# Patient Record
Sex: MALE | Race: WHITE | NOT HISPANIC OR LATINO | Employment: FULL TIME | URBAN - METROPOLITAN AREA
[De-identification: names, ages, dates, MRNs, and addresses within clinical notes are randomized per-mention and may not be internally consistent; named-entity substitution may affect disease eponyms.]

---

## 2023-02-03 ENCOUNTER — HOSPITAL ENCOUNTER (EMERGENCY)
Facility: HOSPITAL | Age: 28
Discharge: HOME | End: 2023-02-03
Attending: EMERGENCY MEDICINE | Admitting: EMERGENCY MEDICINE
Payer: COMMERCIAL

## 2023-02-03 VITALS
WEIGHT: 198.6 LBS | TEMPERATURE: 98.1 F | HEART RATE: 85 BPM | HEIGHT: 70 IN | OXYGEN SATURATION: 96 % | BODY MASS INDEX: 28.43 KG/M2 | SYSTOLIC BLOOD PRESSURE: 121 MMHG | RESPIRATION RATE: 18 BRPM | DIASTOLIC BLOOD PRESSURE: 79 MMHG

## 2023-02-03 DIAGNOSIS — M54.2 NECK PAIN: Primary | ICD-10-CM

## 2023-02-03 DIAGNOSIS — S16.1XXA STRAIN OF NECK MUSCLE, INITIAL ENCOUNTER: ICD-10-CM

## 2023-02-03 DIAGNOSIS — M62.838 MUSCLE SPASM OF SHOULDER REGION: ICD-10-CM

## 2023-02-03 PROCEDURE — 97110 THERAPEUTIC EXERCISES: CPT | Performed by: PHYSICAL THERAPIST

## 2023-02-03 PROCEDURE — G0283 ELEC STIM OTHER THAN WOUND: HCPCS | Performed by: PHYSICAL THERAPIST

## 2023-02-03 PROCEDURE — 25010000002 KETOROLAC TROMETHAMINE PER 15 MG: Performed by: NURSE PRACTITIONER

## 2023-02-03 PROCEDURE — 97161 PT EVAL LOW COMPLEX 20 MIN: CPT | Performed by: PHYSICAL THERAPIST

## 2023-02-03 PROCEDURE — 96372 THER/PROPH/DIAG INJ SC/IM: CPT

## 2023-02-03 PROCEDURE — 25010000002 DEXAMETHASONE SODIUM PHOSPHATE 10 MG/ML SOLUTION: Performed by: NURSE PRACTITIONER

## 2023-02-03 PROCEDURE — 97140 MANUAL THERAPY 1/> REGIONS: CPT | Performed by: PHYSICAL THERAPIST

## 2023-02-03 PROCEDURE — 99283 EMERGENCY DEPT VISIT LOW MDM: CPT

## 2023-02-03 PROCEDURE — 25010000002 ORPHENADRINE CITRATE PER 60 MG: Performed by: NURSE PRACTITIONER

## 2023-02-03 RX ORDER — CYCLOBENZAPRINE HCL 10 MG
10 TABLET ORAL 3 TIMES DAILY PRN
Qty: 15 TABLET | Refills: 0 | Status: SHIPPED | OUTPATIENT
Start: 2023-02-03

## 2023-02-03 RX ORDER — METHYLPREDNISOLONE 4 MG/1
TABLET ORAL
Qty: 21 TABLET | Refills: 0 | Status: SHIPPED | OUTPATIENT
Start: 2023-02-03 | End: 2023-02-09

## 2023-02-03 RX ORDER — IBUPROFEN 800 MG/1
800 TABLET ORAL EVERY 8 HOURS PRN
Qty: 60 TABLET | Refills: 0 | Status: SHIPPED | OUTPATIENT
Start: 2023-02-03

## 2023-02-03 RX ORDER — KETOROLAC TROMETHAMINE 30 MG/ML
30 INJECTION, SOLUTION INTRAMUSCULAR; INTRAVENOUS ONCE
Status: COMPLETED | OUTPATIENT
Start: 2023-02-03 | End: 2023-02-03

## 2023-02-03 RX ORDER — DEXAMETHASONE SODIUM PHOSPHATE 10 MG/ML
10 INJECTION, SOLUTION INTRAMUSCULAR; INTRAVENOUS ONCE
Status: COMPLETED | OUTPATIENT
Start: 2023-02-03 | End: 2023-02-03

## 2023-02-03 RX ORDER — ORPHENADRINE CITRATE 30 MG/ML
60 INJECTION INTRAMUSCULAR; INTRAVENOUS ONCE
Status: COMPLETED | OUTPATIENT
Start: 2023-02-03 | End: 2023-02-03

## 2023-02-03 RX ADMIN — KETOROLAC TROMETHAMINE 30 MG: 30 INJECTION, SOLUTION INTRAMUSCULAR; INTRAVENOUS at 08:02

## 2023-02-03 RX ADMIN — DEXAMETHASONE SODIUM PHOSPHATE 10 MG: 10 INJECTION INTRAMUSCULAR; INTRAVENOUS at 08:04

## 2023-02-03 RX ADMIN — ORPHENADRINE CITRATE 60 MG: 30 INJECTION INTRAMUSCULAR; INTRAVENOUS at 08:00

## 2023-02-03 NOTE — THERAPY EVALUATION
Patient Name: Rober Workman  : 1995    MRN: [id number]                              Today's Date: 2/3/2023       Admit Date: 2/3/2023    Visit Dx:     ICD-10-CM ICD-9-CM   1. Neck pain  M54.2 723.1     There is no problem list on file for this patient.    History reviewed. No pertinent past medical history.  History reviewed. No pertinent surgical history.   General Information     Row Name 23 0819          Physical Therapy Time and Intention    Document Type evaluation  -LR     Mode of Treatment individual therapy  -LR     Row Name 23 0819          General Information    Patient Profile Reviewed yes  -LR     Prior Level of Function independent:  -LR           User Key  (r) = Recorded By, (t) = Taken By, (c) = Cosigned By    Initials Name Provider Type    LR Thais Colby, PT Physical Therapist              History: Pt reports he d antonio a [employer] for work at [employer] and a couple of weeks ago he started having pain in the left side of his neck and around his shoulder blade.  The pain has gotten much worse over the last few days.  He reports he has difficulty getting out of bed, turning his head, and looking up when the pain is bad.  He states his pain is a 4/10 at rest but a 10/10 when it is at its worst.  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  Pt is RHD.  Pt denies N/T.  He also reports lifting things increases his pain.  He has tried Ibuprofen and heat to help with pain.    Objective:    Palpation: Tender to palpation at cervical and upper thoracic spinous processes, L thoracic paraspinals, L levator scapula    ROM:  Active Cervical ROM:  Flexion: 38°  Extension: 20°  L Side bendin°  R Side bendin°  L Rotation: 40°  R Rotation: 50°    Active Shoulder ROM: WNL bilaterally  Tightness through L upper trap  Moderate hypomobility in cervical spine with PA mobilizations     Strength:  L Shoulder MMT:   R Shoulder MMT:  Flexion: 5   Flexion:  5  Abduction: 5   Abduction: 5  External Rotation: 5  External Rotation: 5  Internal Rotation: 5  Internal Rotation: 5    B elbow and wrist strength: 5/5  B  strength WNL    Special Tests:  Cervical Compression Test: negative for radicular symptoms  Cervical Distraction Test: NT     Sensation: B UE sensation intact    Assessment/Plan:   Pt presents with a diagnosis of neck and shoulder pain and has decreased cervical ROM, L upper trap tightness, and tightness through cervical spine that are limiting his ability to turn his head, look up, get in/out of bed, and lift.  Manual therapy was performed to L side of neck to help improve ROM and decrease tightness.  Pt also performed multiple stretches for his neck and was provided with a HEP handout.  Electrical stimulation was applied to L thoracic/scapular/neck region to help decrease pain.       Goals:   LTG 1: The patient will be independent in HEP in order to decrease pain and improve tolerance to functional activities.  STATUS: Met    Interventions:   Manual Therapy: STM to L upper trap and L cervical paraspinals; passive stretching of L upper trap    Therapeutic Exercises: upper trap stretch (3x20 seconds L), levator stretch (3x20 seconds L), chin tuck (5x5 seconds), cervical rotation (5x B), quadruped thoracic rotation, quadruped thread the needle    Modalities: TENS:  IFC to L upper trap/thoracic paraspinals/scapular region; 2 channels, normal settings; no adverse reaction   Outcome Measures     Row Name 02/03/23 0819          Optimal Instrument    Optimal Instrument Optimal - 3  -LR     Pushing 2  -LR     Pulling 2  -LR     Lifting 3  -LR     From the list, choose the 3 activities you would most like to be able to do without any difficulty Pulling;Pushing;Lifting  -LR     Total Score Optimal - 3 7  -LR     Row Name 02/03/23 0819          Functional Assessment    Outcome Measure Options Optimal Instrument  -LR           User Key  (r) = Recorded By, (t) = Taken  By, (c) = Cosigned By    Initials Name Provider Type    LR Thais Colby, PT Physical Therapist               02/03/23 0819   PT Evaluation Complexity   History, PT Evaluation Complexity no personal factors and/or comorbidities   Examination of Body Systems (PT Eval Complexity) 1-2 elements   Clinical Presentation (PT Evaluation Complexity) stable   Clinical Decision Making (PT Evaluation Complexity) low complexity   Overall Complexity (PT Evaluation Complexity) low complexity      Time Calculation:    PT Charges     Row Name 02/03/23 0820             Time Calculation    PT Received On 02/03/23  -LR         Time Calculation- PT    Total Timed Code Minutes- PT 55 minute(s)  -LR         Timed Charges    12493 - PT Therapeutic Exercise Minutes 15  -LR      88544 - PT Manual Therapy Minutes 8  -LR         Untimed Charges    PT Eval/Re-eval Minutes 17  -LR      PT E-Stim Unattended Minutes 15  -LR         Total Minutes    Timed Charges Total Minutes 23  -LR      Untimed Charges Total Minutes 32  -LR       Total Minutes 55  -LR            User Key  (r) = Recorded By, (t) = Taken By, (c) = Cosigned By    Initials Name Provider Type    LR Thais Colby, PT Physical Therapist              Therapy Charges for Today     Code Description Service Date Service Provider Modifiers Qty    28490344552 HC PT THER PROC EA 15 MIN 2/3/2023 Thais Colby, PT GP 1    00995549443 HC PT MANUAL THERAPY EA 15 MIN 2/3/2023 Thais Colby, PT GP 1    75823053062 HC PT EVAL LOW COMPLEXITY 2 2/3/2023 Thais Colby, PT GP 1    28544009829 HC PT ELECTRICAL STIM UNATTENDED 2/3/2023 Thais Colby, PT  1          PT G-Codes  Outcome Measure Options: Optimal Instrument       Thais Colby, PT  2/3/2023

## 2023-02-03 NOTE — ED PROVIDER NOTES
Time: 7:10 AM EST  Date of encounter:  2/3/2023  Independent Historian/Clinical History and Information was obtained by:   Patient  Chief Complaint: Neck pain, shoulder pain    History is limited by: N/A    History of Present Illness:  Patient is a 27 y.o. year old male who presents to the emergency department for evaluation of left posterior neck pain and left shoulder pain.  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  Patient states initially upon starting his job 6 months ago he was not having any issues but has progressively worsened over the last few weeks.  States the pain is worse in the morning and he seems to get relief with stretching and standing in a hot shower.  He denies any past injury to his neck.  Denies any numbness or tingling to his extremities.  Pain does worsen with movement.  Denies any loss of  strength.    HPI    Patient Care Team  Primary Care Provider: Provider, No Known    Past Medical History:     No Known Allergies  History reviewed. No pertinent past medical history.  History reviewed. No pertinent surgical history.  History reviewed. No pertinent family history.    Home Medications:  Prior to Admission medications    Not on File        Social History:   Social History     Tobacco Use   • Smoking status: Every Day     Packs/day: 1.00     Types: Cigarettes   • Smokeless tobacco: Never   Vaping Use   • Vaping Use: Never used   Substance Use Topics   • Alcohol use: Yes     Comment: Occasional   • Drug use: Never         Review of Systems:  Review of Systems   Constitutional: Negative for activity change, appetite change, diaphoresis and fever.   HENT: Negative.    Eyes: Negative.    Respiratory: Negative for cough and shortness of breath.    Cardiovascular: Negative for chest pain.   Gastrointestinal: Negative for abdominal pain, nausea and vomiting.   Endocrine: Negative.   "  Genitourinary: Negative.    Musculoskeletal: Positive for neck pain and neck stiffness. Negative for back pain and gait problem.   Skin: Negative for color change, rash and wound.   Allergic/Immunologic: Negative.    Neurological: Negative for dizziness, weakness, light-headedness and headaches.   Hematological: Negative.    Psychiatric/Behavioral: Negative.         Physical Exam:  /79   Pulse 85   Temp 98.1 °F (36.7 °C) (Oral)   Resp 18   Ht 177.8 cm (70\")   Wt 90.1 kg (198 lb 9.6 oz)   SpO2 96%   BMI 28.50 kg/m²     Physical Exam  Vitals and nursing note reviewed.   Constitutional:       General: He is not in acute distress.     Appearance: Normal appearance. He is not ill-appearing or toxic-appearing.   HENT:      Head: Normocephalic and atraumatic.      Nose: Nose normal.      Mouth/Throat:      Mouth: Mucous membranes are moist.      Pharynx: Oropharynx is clear.   Eyes:      Extraocular Movements: Extraocular movements intact.      Conjunctiva/sclera: Conjunctivae normal.      Pupils: Pupils are equal, round, and reactive to light.   Neck:     Cardiovascular:      Rate and Rhythm: Normal rate and regular rhythm.      Pulses: Normal pulses.      Heart sounds: Normal heart sounds. No murmur heard.    No gallop.   Pulmonary:      Effort: Pulmonary effort is normal. No respiratory distress.      Breath sounds: Normal breath sounds. No wheezing, rhonchi or rales.   Chest:      Chest wall: No tenderness.   Abdominal:      General: Bowel sounds are normal. There is no distension.      Palpations: Abdomen is soft.      Tenderness: There is no abdominal tenderness.   Musculoskeletal:         General: Tenderness present. No swelling, deformity or signs of injury.      Cervical back: Neck supple. Tenderness present. No rigidity. Decreased range of motion.   Skin:     General: Skin is warm and dry.      Capillary Refill: Capillary refill takes less than 2 seconds.      Coloration: Skin is not pale.      " Findings: No erythema or rash.   Neurological:      General: No focal deficit present.      Mental Status: He is alert and oriented to person, place, and time.      Sensory: No sensory deficit.      Motor: No weakness.   Psychiatric:         Mood and Affect: Mood normal.         Behavior: Behavior normal.                  Procedures:  Procedures      Medical Decision Making:      Comorbidities that affect care:    None    External Notes reviewed:    None      The following orders were placed and all results were independently analyzed by me:  Orders Placed This Encounter   Procedures   • PT Consult: Eval & Treat Functional Mobility Below Baseline       Medications Given in the Emergency Department:  Medications   ketorolac (TORADOL) injection 30 mg (30 mg Intramuscular Given 2/3/23 0802)   dexamethasone sodium phosphate injection 10 mg (10 mg Intramuscular Given 2/3/23 0804)   orphenadrine (NORFLEX) injection 60 mg (60 mg Intramuscular Given 2/3/23 0800)        ED Course:         Labs:    Lab Results (last 24 hours)     ** No results found for the last 24 hours. **           Imaging:    No Radiology Exams Resulted Within Past 24 Hours      Differential Diagnosis and Discussion:    Neck Pain: The patient presents with neck pain. My differential diagnosis includes but is not limited to acute spinal epidural abscess, acute spinal epidural bleed, meningitis, musculoskeletal neck pain, spinal fracture, and osteoarthritis.         MDM  Number of Diagnoses or Management Options  Muscle spasm of shoulder region  Strain of neck muscle, initial encounter  Diagnosis management comments: Patient presents today with neck and shoulder pain.  This does appear to be musculoskeletal in nature.  Patient has no loss of  strength, no loss of muscle strength.  He is neurologically and neurovascularly intact.  He is afebrile.  I do not have any suspicion of abscess or systemic infection.  He has no meningeal signs or symptoms.  He  was given NSAIDs, steroid injection and muscle relaxer while here in the ER with good symptomatic relief.  I am sending the patient home with these medications by mouth.  I instructed the patient to perform gentle range of motion and stretching exercises at home as well as alternating heat and ice.  Patient was instructed to follow-up with his family doctor should he have ongoing or persistent symptoms or return to the emergency department with any new or worsening symptoms.  He verbalized understanding.  No further work-up is warranted at this time.       Amount and/or Complexity of Data Reviewed  Tests in the medicine section of CPT®: reviewed and ordered  Review and summarize past medical records: yes    Risk of Complications, Morbidity, and/or Mortality  Presenting problems: low  Diagnostic procedures: low  Management options: low    Patient Progress  Patient progress: stable           Patient Care Considerations:    NARCOTICS: I considered prescribing opiate pain medication as an outpatient, however patient had good relief with NSAIDS, muscle relaxers, and steroid      Consultants/Shared Management Plan:    None    Social Determinants of Health:    Patient is independent, reliable, and has access to care.       Disposition and Care Coordination:    Discharged: The patient is suitable and stable for discharge with no need for consideration of observation or admission.    I have explained discharge medications and the need for follow up with the patient/caretakers. This was also printed in the discharge instructions. Patient was discharged with the following medications and follow up:      Medication List      New Prescriptions    cyclobenzaprine 10 MG tablet  Commonly known as: FLEXERIL  Take 1 tablet by mouth 3 (Three) Times a Day As Needed for Muscle Spasms.     ibuprofen 800 MG tablet  Commonly known as: ADVIL,MOTRIN  Take 1 tablet by mouth Every 8 (Eight) Hours As Needed for Moderate Pain.      methylPREDNISolone 4 MG dose pack  Commonly known as: MEDROL  Take 6 tablets by mouth Daily for 1 day, THEN 5 tablets Daily for 1 day, THEN 4 tablets Daily for 1 day, THEN 3 tablets Daily for 1 day, THEN 2 tablets Daily for 1 day, THEN 1 tablet Daily for 1 day. Take as directed on package instructions.  Start taking on: February 3, 2023           Where to Get Your Medications      These medications were sent to [id number] - [place] - [address] AT [facility] - [phone]  - [phone]   [address] [facility] [address]    Phone: [phone]   · cyclobenzaprine 10 MG tablet  · ibuprofen 800 MG tablet  · methylPREDNISolone 4 MG dose pack      Provider, No Known  [facility] [place] [address]    Schedule an appointment as soon as possible for a visit   If symptoms worsen    [facility] EMERGENCY ROOM  [address] [facility] [address] [phone]  Go to   As needed, If symptoms worsen       Final diagnoses:   Strain of neck muscle, initial encounter   Muscle spasm of shoulder region        ED Disposition     ED Disposition   Discharge    Condition   Stable    Comment   --             This medical record created using voice recognition software.           Bee Middleton, APRN  02/03/23 0825

## 2023-02-03 NOTE — DISCHARGE INSTRUCTIONS
I suspect you have a muscle strain and muscle spasms due to the repetition of your job.  As we discussed, please perform gentle range of motion exercises at home.  Alternate heat and ice to your neck.  I have sent in medications for you to take.  Please take as instructed.  No driving or operating machinery while taking the muscle relaxer.  Follow-up with your family doctor should you have ongoing or persistent symptoms.  Return to the emergency department if you have new or worsening symptoms such as loss of function of your extremities, numbness or tingling to your extremities.